# Patient Record
Sex: MALE | Race: WHITE | Employment: FULL TIME | URBAN - METROPOLITAN AREA
[De-identification: names, ages, dates, MRNs, and addresses within clinical notes are randomized per-mention and may not be internally consistent; named-entity substitution may affect disease eponyms.]

---

## 2021-01-25 ENCOUNTER — OFFICE VISIT (OUTPATIENT)
Dept: ORTHOPEDIC SURGERY | Age: 71
End: 2021-01-25
Payer: MEDICARE

## 2021-01-25 VITALS — HEIGHT: 72 IN | TEMPERATURE: 97.5 F | BODY MASS INDEX: 28.44 KG/M2 | WEIGHT: 210 LBS

## 2021-01-25 DIAGNOSIS — M25.512 ACUTE PAIN OF LEFT SHOULDER: Primary | ICD-10-CM

## 2021-01-25 DIAGNOSIS — M19.012 ARTHRITIS OF LEFT SHOULDER REGION: ICD-10-CM

## 2021-01-25 PROCEDURE — G8427 DOCREV CUR MEDS BY ELIG CLIN: HCPCS | Performed by: ORTHOPAEDIC SURGERY

## 2021-01-25 PROCEDURE — 4040F PNEUMOC VAC/ADMIN/RCVD: CPT | Performed by: ORTHOPAEDIC SURGERY

## 2021-01-25 PROCEDURE — 99203 OFFICE O/P NEW LOW 30 MIN: CPT | Performed by: ORTHOPAEDIC SURGERY

## 2021-01-25 PROCEDURE — 20610 DRAIN/INJ JOINT/BURSA W/O US: CPT | Performed by: ORTHOPAEDIC SURGERY

## 2021-01-25 PROCEDURE — G8417 CALC BMI ABV UP PARAM F/U: HCPCS | Performed by: ORTHOPAEDIC SURGERY

## 2021-01-25 PROCEDURE — G8484 FLU IMMUNIZE NO ADMIN: HCPCS | Performed by: ORTHOPAEDIC SURGERY

## 2021-01-25 PROCEDURE — 1123F ACP DISCUSS/DSCN MKR DOCD: CPT | Performed by: ORTHOPAEDIC SURGERY

## 2021-01-25 PROCEDURE — 1036F TOBACCO NON-USER: CPT | Performed by: ORTHOPAEDIC SURGERY

## 2021-01-25 PROCEDURE — 3017F COLORECTAL CA SCREEN DOC REV: CPT | Performed by: ORTHOPAEDIC SURGERY

## 2021-01-25 RX ORDER — LIDOCAINE HYDROCHLORIDE 10 MG/ML
4 INJECTION, SOLUTION INFILTRATION; PERINEURAL ONCE
Status: COMPLETED | OUTPATIENT
Start: 2021-01-25 | End: 2021-01-25

## 2021-01-25 RX ORDER — METHYLPREDNISOLONE ACETATE 40 MG/ML
80 INJECTION, SUSPENSION INTRA-ARTICULAR; INTRALESIONAL; INTRAMUSCULAR; SOFT TISSUE ONCE
Status: COMPLETED | OUTPATIENT
Start: 2021-01-25 | End: 2021-01-25

## 2021-01-25 RX ADMIN — METHYLPREDNISOLONE ACETATE 80 MG: 40 INJECTION, SUSPENSION INTRA-ARTICULAR; INTRALESIONAL; INTRAMUSCULAR; SOFT TISSUE at 14:20

## 2021-01-25 RX ADMIN — LIDOCAINE HYDROCHLORIDE 4 ML: 10 INJECTION, SOLUTION INFILTRATION; PERINEURAL at 14:20

## 2021-01-25 ASSESSMENT — ENCOUNTER SYMPTOMS
RESPIRATORY NEGATIVE: 1
GASTROINTESTINAL NEGATIVE: 1
ALLERGIC/IMMUNOLOGIC NEGATIVE: 1
EYES NEGATIVE: 1

## 2021-01-25 NOTE — PROGRESS NOTES
Subjective:      Patient ID: Enoc Bryson is a 72 y.o. male. HPI  Enoc Bryson is seen today for evaluation of left shoulder pain. His pain started in September of last year when he was moving a couch. Since that time he gets intermittent pain with movement that can be as bad as 10 out of 10. He denies any pain at rest.  He is right-hand dominant. 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 He is otherwise mostly healthy. He has been using Tylenol and heat for his shoulder pain. Review of Systems   Constitutional: Negative. HENT: Positive for ear discharge. Eyes: Negative. Respiratory: Negative. Cardiovascular: Negative. Gastrointestinal: Negative. Endocrine: Negative. Genitourinary: Negative. Musculoskeletal:        Previous bilateral knee surgery    Skin: Negative. Allergic/Immunologic: Negative. Neurological: Negative. Hematological: Negative. Psychiatric/Behavioral: Negative. Objective:   Physical Exam  History: Patient's relevant past family, medical, and social history are reviewed as part of today's visit. ROS of pertinent positives and negatives as above; otherwise negative. General Exam:    Vitals: Temperature 97.5 °F (36.4 °C), temperature source Temporal, height 6' (1.829 m), weight 210 lb (95.3 kg). Constitutional: Patient is adequately groomed with no evidence of malnutrition  Mental Status: The patient is oriented to time, place and person. The patient's mood and affect are appropriate. Gait:  Patient walks with normal gait and station. Lymphatic: The lymphatic examination bilaterally reveals all areas to be without enlargement or induration. Vascular: Examination reveals no swelling or calf tenderness. Peripheral pulses are palpable and 2+. Neurological: The patient has good coordination. There is no weakness or sensory deficit. Skin:    Head/Neck: inspection reveals no rashes, ulcerations or lesions.   Trunk:  inspection reveals no rashes, ulcerations or lesions. Right Lower Extremity: inspection reveals no rashes, ulcerations or lesions. Left Lower Extremity: inspection reveals no rashes, ulcerations or lesions. Examination of the cervical spine reveals no restriction in motion. There are no reproduction of symptoms into either arm with flexion, extension, rotation or palpation. The patient has a negative Spurling sign, and no tenderness. Right shoulder has discomfort with elevation above 150 degrees with good strength and no crepitation with normal motion. Left shoulder has pain with Casey and impingement maneuvers. He has 4/5 strength in external rotation and abduction with only mild discomfort. Is no instability or tenderness. Neurologic and vascular exams are normal.  He has symmetric motion to the right side. Xrays of the left shoulder were obtained today. AP the scapular plane, axillary lateral, and scapular Y. These demonstrate: Moderate glenohumeral arthritis and a type III acromion. Assessment:      His symptoms seem mostly related to his left shoulder arthritis. Plan: We will start with a cortisone injection. Procedure: Under sterile technique, left shoulder was injected anteriorly into the glenohumeral space with 80 mg of Depo-Medrol and 40 mg of lidocaine. He tolerated that well. Follow-up with me in a month.

## 2021-02-22 ENCOUNTER — OFFICE VISIT (OUTPATIENT)
Dept: ORTHOPEDIC SURGERY | Age: 71
End: 2021-02-22
Payer: MEDICARE

## 2021-02-22 VITALS
WEIGHT: 210 LBS | HEART RATE: 58 BPM | HEIGHT: 72 IN | TEMPERATURE: 96.8 F | DIASTOLIC BLOOD PRESSURE: 79 MMHG | BODY MASS INDEX: 28.44 KG/M2 | SYSTOLIC BLOOD PRESSURE: 126 MMHG

## 2021-02-22 DIAGNOSIS — M25.512 ACUTE PAIN OF LEFT SHOULDER: Primary | ICD-10-CM

## 2021-02-22 DIAGNOSIS — M19.012 ARTHRITIS OF LEFT SHOULDER REGION: ICD-10-CM

## 2021-02-22 PROCEDURE — G8417 CALC BMI ABV UP PARAM F/U: HCPCS | Performed by: ORTHOPAEDIC SURGERY

## 2021-02-22 PROCEDURE — G8427 DOCREV CUR MEDS BY ELIG CLIN: HCPCS | Performed by: ORTHOPAEDIC SURGERY

## 2021-02-22 PROCEDURE — 4040F PNEUMOC VAC/ADMIN/RCVD: CPT | Performed by: ORTHOPAEDIC SURGERY

## 2021-02-22 PROCEDURE — G8484 FLU IMMUNIZE NO ADMIN: HCPCS | Performed by: ORTHOPAEDIC SURGERY

## 2021-02-22 PROCEDURE — 3017F COLORECTAL CA SCREEN DOC REV: CPT | Performed by: ORTHOPAEDIC SURGERY

## 2021-02-22 PROCEDURE — 99212 OFFICE O/P EST SF 10 MIN: CPT | Performed by: ORTHOPAEDIC SURGERY

## 2021-02-22 PROCEDURE — 1036F TOBACCO NON-USER: CPT | Performed by: ORTHOPAEDIC SURGERY

## 2021-02-22 PROCEDURE — 1123F ACP DISCUSS/DSCN MKR DOCD: CPT | Performed by: ORTHOPAEDIC SURGERY

## 2021-02-22 NOTE — PROGRESS NOTES
Javier Younger returns today for his left shoulder. He says that with movement or shoveling snow pain is 6 out of 10. He has no pain at rest.  He does not feel like the cortisone injection given a month ago was helpful at all. Patient's medications, allergies, past medical, surgical, social and family histories were reviewed and updated as appropriate. Relevant review of systems reviewed. General Exam:    Vitals: Blood pressure 126/79, pulse 58, temperature 96.8 °F (36 °C), temperature source Temporal, height 6' (1.829 m), weight 210 lb (95.3 kg). Constitutional: Patient is adequately groomed with no evidence of malnutrition  Mental Status: The patient is oriented to time, place and person. The patient's mood and affect are appropriate. 160 degrees and external rotation to 50 with internal rotation to the lumbar spine. Strength throughout is 4/5. He has moderate discomfort in all planes. Neurologic and vascular exams of left upper extremity are normal.    Assessment: Refractory left shoulder pain despite injection and activity modification. Plan: MRI scan left shoulder for potential presurgical planning. Follow-up with me after the scan. This note was created using voice recognition software. It has been proofread, but occasionally errors remain. Please disregard these errors. They will be corrected as they are noted.

## 2021-03-11 ENCOUNTER — OFFICE VISIT (OUTPATIENT)
Dept: ORTHOPEDIC SURGERY | Age: 71
End: 2021-03-11
Payer: MEDICARE

## 2021-03-11 VITALS
BODY MASS INDEX: 28.44 KG/M2 | RESPIRATION RATE: 12 BRPM | HEART RATE: 62 BPM | WEIGHT: 210 LBS | SYSTOLIC BLOOD PRESSURE: 127 MMHG | HEIGHT: 72 IN | DIASTOLIC BLOOD PRESSURE: 76 MMHG | TEMPERATURE: 97.1 F

## 2021-03-11 DIAGNOSIS — M25.512 ACUTE PAIN OF LEFT SHOULDER: Primary | ICD-10-CM

## 2021-03-11 DIAGNOSIS — M19.012 ARTHRITIS OF LEFT SHOULDER REGION: ICD-10-CM

## 2021-03-11 PROCEDURE — 99212 OFFICE O/P EST SF 10 MIN: CPT | Performed by: ORTHOPAEDIC SURGERY

## 2021-03-11 NOTE — PROGRESS NOTES
Javier Younger returns today to follow-up his left shoulder MRI. He continues to complain of pain at least 5 out of 10. He had no relief with injection. Patient's medications, allergies, past medical, surgical, social and family histories were reviewed and updated as appropriate. Relevant review of systems reviewed. General Exam:    Vitals: Blood pressure 127/76, pulse 62, temperature 97.1 °F (36.2 °C), temperature source Infrared, resp. rate 12, height 6' (1.829 m), weight 210 lb (95.3 kg). Constitutional: Patient is adequately groomed with no evidence of malnutrition  Mental Status: The patient is oriented to time, place and person. The patient's mood and affect are appropriate. Left shoulder has some minimal tenderness anteriorly. He can elevate to 160 degrees and excellent rotate to 50 with internal rotation to the mid lumbar spine. Strength is 4+/5. MRI scan is reviewed. It demonstrates:       Moderate to severe glenohumeral osteoarthritis is present with moderate-sized spurs    projecting off the inferomedial humeral head, small spurs projecting off the inferior glenoid,    high-grade chondromalacia throughout the posterior and inferior glenoid with underlying    subchondral cysts and subchondral bone marrow reaction, and joint space narrowing which is most    prominent posteriorly and inferiorly.       A large nondisplaced glenoid labral tear is present which involves the posterior, inferior and    superior glenoid labrum with multiple adjacent small paralabral cysts.  Although one of the    paralabral cysts is within the spinoglenoid notch, there is no evidence of muscular denervation    of the infraspinatus muscle to suggest associated suprascapular nerve entrapment at this time.       A small glenohumeral effusion is present.       Mild tendinosis and peritendinitis of the supraspinatus and infraspinatus tendons is present    without rotator cuff tear.  The teres minor and subscapularis tendons are intact with no    evidence of tendinosis or tendon tear.       Mild tendinosis of the intraarticular segment of the long head of the biceps tendon is present    without biceps tendon tear.       Mild lateral outlet stenosis is present secondary to inferolateral acromial tilt which could    predispose to rotator cuff impingement.       Mild to moderate severity acromioclavicular osteoarthritis is present with mild to moderate    spurring and marginal bone marrow reaction with no substantial associated medial outlet    stenosis.       No evidence of acute muscular strain within the left shoulder, and no substantial fatty    infiltration or atrophy of the musculature of the left shoulder.               CONCLUSION:   1. Moderate to severe glenohumeral osteoarthritis (as detailed above). 2. Large nondisplaced glenoid labral tear involving the posterior, superior and inferior    glenoid labrum with multiple small adjacent paralabral cysts. 3. Small glenohumeral effusion. 4. Mild tendinosis and peritendinitis of the supraspinatus and infraspinatus tendons without    rotator cuff tear. 5. Mild tendinosis of the intraarticular segment of the long head of the biceps tendon without    biceps tendon tear. 6. Mild lateral outlet stenosis secondary to inferolateral acromial tilt which could predispose    to rotator cuff impingement. 7. Mild to moderate severity acromioclavicular osteoarthritis. I reviewed these findings on the report and the images with the patient. Assessment: Significant glenohumeral arthritis without evidence of significant cuff pathology. Plan: I am recommending he see consultation with Dr. Kelle Horan for consideration of arthroplasty. This note was created using voice recognition software. It has been proofread, but occasionally errors remain. Please disregard these errors. They will be corrected as they are noted.

## 2021-04-01 ENCOUNTER — OFFICE VISIT (OUTPATIENT)
Dept: ORTHOPEDIC SURGERY | Age: 71
End: 2021-04-01
Payer: MEDICARE

## 2021-04-01 VITALS — HEIGHT: 72 IN | WEIGHT: 210 LBS | BODY MASS INDEX: 28.44 KG/M2

## 2021-04-01 DIAGNOSIS — M25.512 ACUTE PAIN OF LEFT SHOULDER: ICD-10-CM

## 2021-04-01 DIAGNOSIS — M25.512 LEFT SHOULDER PAIN, UNSPECIFIED CHRONICITY: ICD-10-CM

## 2021-04-01 DIAGNOSIS — M19.012 ARTHRITIS OF LEFT SHOULDER REGION: Primary | ICD-10-CM

## 2021-04-01 PROBLEM — M71.21: Status: ACTIVE | Noted: 2019-12-11

## 2021-04-01 PROBLEM — E78.00 HYPERCHOLESTEREMIA: Status: ACTIVE | Noted: 2017-01-27

## 2021-04-01 PROBLEM — R00.1 PERSISTENT SINUS BRADYCARDIA: Status: ACTIVE | Noted: 2017-01-25

## 2021-04-01 PROBLEM — M25.561 RIGHT KNEE PAIN: Status: ACTIVE | Noted: 2019-10-02

## 2021-04-01 PROBLEM — Z82.49 FAMILY HISTORY OF PACEMAKER: Status: ACTIVE | Noted: 2017-01-25

## 2021-04-01 PROBLEM — M17.11 PRIMARY OSTEOARTHRITIS OF RIGHT KNEE: Status: ACTIVE | Noted: 2019-10-02

## 2021-04-01 PROBLEM — F51.04 CHRONIC INSOMNIA: Status: ACTIVE | Noted: 2017-01-25

## 2021-04-01 PROBLEM — E55.9 VITAMIN D DEFICIENCY: Status: ACTIVE | Noted: 2017-01-27

## 2021-04-01 PROBLEM — M72.2 PLANTAR FASCIITIS, LEFT: Status: ACTIVE | Noted: 2020-03-16

## 2021-04-01 PROBLEM — Z78.9 CONSUMES THREE BEERS DAILY: Status: ACTIVE | Noted: 2017-01-25

## 2021-04-01 PROBLEM — Z71.89 ADVANCE DIRECTIVE DISCUSSED WITH PATIENT: Status: ACTIVE | Noted: 2017-01-25

## 2021-04-01 PROCEDURE — 99214 OFFICE O/P EST MOD 30 MIN: CPT | Performed by: ORTHOPAEDIC SURGERY

## 2021-04-01 NOTE — PROGRESS NOTES
[facility] [address] [facility]  Office Visit  New Patient  Date:  2021    Name:  Luz Medina  Address:  [address] [address]    :  1950      Age:   72 y.o.    SSN:  [id number]      Medical Record Number:  <[id number]>    Chief Complaint:    Left shoulder pain    HPI:   Luz Medina is a 72 y.o. male who presents on referral from Dr. Marcus Garcia for consultation regarding ongoing left shoulder pain. He states he began having left shoulder pain in the Fall of . He denies injury at that time. He was evaluated by Dr. Marcus Garcia, who diagnosed osteoarthritis. He has had an MRI and x-rays and was ultimately given a steroid injection in January of this year without much relief. He is not taking any NSAIDs and is not on any formal physical therapy at this time. He denies any catching or locking or grinding. He has no pain at rest and the pain seems to be aggravated with overhead or outstretched activities. He localizes the pain to the anterior lateral aspect of the shoulder. He denies any new numbness, tingling, fevers, chills, chest pain, shortness of breath, or any other new significant symptoms. Pain Assessment  Location of Pain: Shoulder  Location Modifiers: Left  Severity of Pain: 0  Quality of Pain: Sharp  Duration of Pain: A few minutes  Frequency of Pain: Intermittent  Aggravating Factors: Straightening, Stretching, Other (Comment)(REACHING, BEHIND BACK)  Limiting Behavior: Yes  Relieving Factors: Rest  Result of Injury: No  Work-Related Injury: No  Are there other pain locations you wish to document?: No    Past History:  History reviewed. No pertinent past medical history.     Past Surgical History:   Procedure Laterality Date    HERNIA REPAIR N/A     KNEE ARTHROSCOPY Bilateral     TONSILLECTOMY N/A        Social History     Tobacco Use    Smoking status: Never Smoker    Smokeless tobacco: Never Used   Substance Use Topics    Alcohol use: Yes     Alcohol/week: 12.0 standard drinks     Types: 12 Cans of beer per week    Drug use: Never        Family History:  family history is not on file. Current Outpatient Medications:     OMEPRAZOLE PO, Take by mouth as needed, Disp: , Rfl:       Allergies   Allergen Reactions    Chlorzoxazone Other (See Comments)     fever           Review of Systems: A 14 point review of systems available in the scanned medical record as documented by the patient on 4/1/21. Today's review pertinent items are noted in HPI. Musculoskeletal ROS: positive for - pain in left shoulder        Physical Exam:  Ht 6' (1.829 m)   Wt 210 lb (95.3 kg)   BMI 28.48 kg/m²       General: No acute distress, well nourished  CV: No obvious peripheral edema. Normal peripheral pulses  Neuro: Alert & oriented x 3  Psych: Normal mood and affect    Left shoulder exam    Grossly no periscapular atrophy noted  Flexion-160 degrees bilaterally  Abduction-160s bilaterally  External rotation-50 degrees on the left, 70 degrees on the right  Internal rotation-L1 on the left, T12 on the right  5/5 strength with internal rotation, external rotation, Argelia, Blaine toast  Negative speeds, negative Yergason's  Negative Casey, negative Neer's    Radiographic:  No new imaging today. MRI was reviewed demonstrating no rotator cuff tear but some rotator cuff tendinitis. He does have mild to moderate glenohumeral joint arthritis as well. X-ray does show a mild to moderate arthritis of the glenohumeral joint    Assessment:  Sam López is a 72 y.o. male with:  1. Left shoulder glenohumeral arthritis    Impression:  Encounter Diagnoses   Name Primary?     Arthritis of left shoulder region Yes    Acute pain of left shoulder     Left shoulder pain, unspecified chronicity        Office Procedures:  Orders Placed This Encounter   Procedures   Lianet Thorpe MD, Orthopedic Surgery, [facility]     Referral Priority:   Routine

## 2021-04-14 ENCOUNTER — OFFICE VISIT (OUTPATIENT)
Dept: ORTHOPEDIC SURGERY | Age: 71
End: 2021-04-14
Payer: MEDICARE

## 2021-04-14 VITALS
HEART RATE: 59 BPM | HEIGHT: 72 IN | BODY MASS INDEX: 28.44 KG/M2 | SYSTOLIC BLOOD PRESSURE: 134 MMHG | WEIGHT: 210 LBS | DIASTOLIC BLOOD PRESSURE: 81 MMHG

## 2021-04-14 DIAGNOSIS — M19.012 ARTHRITIS OF LEFT SHOULDER REGION: Primary | ICD-10-CM

## 2021-04-14 PROCEDURE — 99212 OFFICE O/P EST SF 10 MIN: CPT | Performed by: ORTHOPAEDIC SURGERY

## 2021-04-14 PROCEDURE — 20611 DRAIN/INJ JOINT/BURSA W/US: CPT | Performed by: ORTHOPAEDIC SURGERY

## 2021-04-14 RX ORDER — METHYLPREDNISOLONE ACETATE 40 MG/ML
80 INJECTION, SUSPENSION INTRA-ARTICULAR; INTRALESIONAL; INTRAMUSCULAR; SOFT TISSUE ONCE
Status: COMPLETED | OUTPATIENT
Start: 2021-04-14 | End: 2021-04-14

## 2021-04-14 RX ORDER — LIDOCAINE HYDROCHLORIDE 10 MG/ML
5 INJECTION, SOLUTION INFILTRATION; PERINEURAL ONCE
Status: COMPLETED | OUTPATIENT
Start: 2021-04-14 | End: 2021-04-14

## 2021-04-14 RX ADMIN — LIDOCAINE HYDROCHLORIDE 5 ML: 10 INJECTION, SOLUTION INFILTRATION; PERINEURAL at 10:22

## 2021-04-14 RX ADMIN — METHYLPREDNISOLONE ACETATE 80 MG: 40 INJECTION, SUSPENSION INTRA-ARTICULAR; INTRALESIONAL; INTRAMUSCULAR; SOFT TISSUE at 10:23

## 2021-04-14 NOTE — PROGRESS NOTES
Follow Up Shoulder Evaluation   4/14/2021    Kodi Mendez      1950        Lynne Nielsen is seen in follow up for Follow-up (Left shoulder ultrasound Glenohumeral injection. Referred by Dr Kyle Ragsdale)     Lynne Nielsen is a 45-year-old patient sent by Dr. Tiffany Dallas for an ultrasound directed intra-articular steroid injection of his left shoulder. He has had 1 previous injection in January performed by Dr. Simone Eddy however did not obtain relief from that injection. X-rays and a more recent MRI scan performed on March 9, 2021 did verify the presence of osteoarthritis of the left glenohumeral joint. In addition there was some MRI scan evidence of tendinosis of the rotator cuff and biceps tendon. Because of his persistence of pain he was sent to our office for an ultrasound directed injection of his glenohumeral joint. I have today reviewed with Kodi Mendez the clinically relevant past medical history, medications, allergies, family history, and Review of Systems from the patients most recent history form, and I have documented any details relevant to today's presenting complaints in my history above. The patient's self recorded documents concerning the above have been scanned  into the chart under the \"Media\" tab. /81   Pulse 59   Ht 6' (1.829 m)   Wt 210 lb (95.3 kg)   BMI 28.48 kg/m²     PHYSICAL EXAMINATION          X-Ray Findings taken in Office: X-rays taken on January 25, 2021 show moderate changes of glenohumeral arthritis with osteophytic changes about the humeral head. Humeral acromial space is maintained. MRI Findings: MRI scan performed on March 9, 2021 did verify the presence of osteoarthritis of the left glenohumeral joint. In addition there was some MRI scan evidence of tendinosis of the rotator cuff and biceps tendon. Impression:   Left shoulder glenohumeral neuritis with superimposed rotator cuff and biceps tendinosis. Plan/Treatment:   1.   Injection with cortisone was recommended into  right   shoulder glenohumeral joint using ultrasound visualization. He understands the risks and benefits of the injection and describes no potential allergies. Time out was performed to verify correct person, correct procedure, and correct site. 2.  The structures of the shoulder were visualized with a disco volante Ultrasound unit using an 5/2 MHz CurviLinear Probe. The injection site was cleaned with ChloroPrep, infiltrated with 3 mL of 1% Xylocaine, then  2 ml  of 40 mg Depo Medrol combined with 2 ml of 1% Lidocaine were injected into the glenohumeral joint. 3.  Since he has had no physical therapy I have written a prescription for PT for rehabilitation of his left shoulder he has there is an element of tendinosis associated with his arthritis. Karel Hogan MD  4/14/2021    This dictation was done with Dragon dictation and may contain mechanical errors related to translation.

## 2021-04-14 NOTE — PATIENT INSTRUCTIONS
Impression:   Left shoulder glenohumeral neuritis with superimposed rotator cuff and biceps tendinosis. Plan/Treatment:   1. Injection with cortisone was recommended into  right   shoulder glenohumeral joint using ultrasound visualization. He understands the risks and benefits of the injection and describes no potential allergies. Time out was performed to verify correct person, correct procedure, and correct site. 2.  The structures of the shoulder were visualized with a Widget[employer] Ultrasound unit using an 5/2 MHz CurviLinear Probe. The injection site was cleaned with ChloroPrep, infiltrated with 3 mL of 1% Xylocaine, then  2 ml  of 40 mg Depo Medrol combined with 2 ml of 1% Lidocaine were injected into the glenohumeral joint. 3.  Since he has had no physical therapy I have written a prescription for PT for rehabilitation of his left shoulder he has there is an element of tendinosis associated with his arthritis.       Curt Garcia MD  4/14/2021

## 2021-04-26 ENCOUNTER — HOSPITAL ENCOUNTER (OUTPATIENT)
Dept: PHYSICAL THERAPY | Age: 71
Setting detail: THERAPIES SERIES
Discharge: HOME | End: 2021-04-26
Payer: MEDICARE

## 2021-04-26 PROCEDURE — 97112 NEUROMUSCULAR REEDUCATION: CPT | Performed by: PHYSICAL THERAPIST

## 2021-04-26 PROCEDURE — 97110 THERAPEUTIC EXERCISES: CPT | Performed by: PHYSICAL THERAPIST

## 2021-04-26 PROCEDURE — 97161 PT EVAL LOW COMPLEX 20 MIN: CPT | Performed by: PHYSICAL THERAPIST

## 2021-04-26 NOTE — FLOWSHEET NOTE
[address] [facility],[address], [address] [facility], [address]  Phone: [phone]   Fax: [phone]    Physical Therapy Treatment Note/ Progress Report:           Date:  2021    Patient Name:  Hermelindo Dejesus    :  1950  MRN: [id number]  Restrictions/Precautions:    Medical/Treatment Diagnosis Information:  Diagnosis: Arthritis of left shoulder region - M19.012  Treatment Diagnosis: decreased ROM, strength, and high-level IADLs  Insurance/Certification information:  PT Insurance Information: [place] [address]  Physician Information:  Referring Practitioner: Dr. Mannie Martin  Has the plan of care been signed (Y/N):        []  Yes  [x]  No     Date of Patient follow up with Physician: 21 - Dr. Yelena Eid      Is this a Progress Report:     []  Yes  [x]  No        Progress report/ Recertification will be due (10 Rx or 30 days whichever is less): 24 May 2021      Visit # Insurance Allowable Auth Required   1 BMN []  Yes []  No        Functional Scale:   UEFI   - 24% limitation      Latex Allergy:  [x]NO      []YES  Preferred Language for Healthcare:   [x]English       []other:      Pain level:  3-4 /10       SUBJECTIVE:  See bri      OBJECTIVE: See bri     Observation:    Test measurements:      RESTRICTIONS/PRECAUTIONS: none    Exercises/Interventions:   Exercise/Equipment Resistance/Repetitions Other comments   Stretching/PROM     Upper trap stretch 30 sec x 3 Added     Wall slides - flex 10 sec x 10  Added    IR cane 10 sec x 10  Added    Sleeper wall stretch 10 sec x 10  Added    Pendulum          Isometrics     Retraction 10 sec x 10  Added         Weight shift     Flexion     Abduction     External Rotation     Internal Rotation     Biceps     Triceps          PRE's     Flexion     Abduction     External Rotation     Internal Rotation     Shrugs     EXT     Reverse Flys     Serratus     Horizontal Abd with ER Biceps     Triceps     Retraction          Cable Column/Theraband     External Rotation     Internal Rotation     Shrugs     Lats     Ext     Flex     Scapular Retraction     BIC     TRIC     PNF          Dynamic Stability          Plyoback          Manual interventions     Rib mobilizations NV    Thoracic mobilizations NV                 Therapeutic Exercise and NMR EXR  [x] (05853) Provided verbal/tactile cueing for activities related to strengthening, flexibility, endurance, ROM  for improvements in scapular, scapulothoracic and UE control with self care, reaching, carrying, lifting, house/yardwork, driving/computer work. [x] (61224) Provided verbal/tactile cueing for activities related to improving balance, coordination, kinesthetic sense, posture, motor skill, proprioception  to assist with  scapular, scapulothoracic and UE control with self care, reaching, carrying, lifting, house/yardwork, driving/computer work. Therapeutic Activities:    [x] (04155 or 11810) Provided verbal/tactile cueing for activities related to improving balance, coordination, kinesthetic sense, posture, motor skill, proprioception and motor activation to allow for proper function of scapular, scapulothoracic and UE control with self care, carrying, lifting, driving/computer work.      Home Exercise Program:    [x] (14508) Reviewed/Progressed HEP activities related to strengthening, flexibility, endurance, ROM of scapular, scapulothoracic and UE control with self care, reaching, carrying, lifting, house/yardwork, driving/computer work  [] (82864) Reviewed/Progressed HEP activities related to improving balance, coordination, kinesthetic sense, posture, motor skill, proprioception of scapular, scapulothoracic and UE control with self care, reaching, carrying, lifting, house/yardwork, driving/computer work      Access Code: [email or web address]    Manual Treatments:  PROM / STM / Oscillations-Mobs:  G-I, II, III, IV (PA's, Inf., Post.)  [] (18594) Provided manual therapy to mobilize soft tissue/joints of cervical/CT, scapular GHJ and UE for the purpose of modulating pain, promoting relaxation,  increasing ROM, reducing/eliminating soft tissue swelling/inflammation/restriction, improving soft tissue extensibility and allowing for proper ROM for normal function with self care, reaching, carrying, lifting, house/yardwork, driving/computer work    Modalities:     [] GAME READY (VASO)- for significant edema, swelling, pain control. Charges:  Timed Code Treatment Minutes: 25'    Total Treatment Minutes: 39'       [x] EVAL (LOW) 54455 (typically 20 minutes face-to-face)  [] EVAL (MOD) 12027 (typically 30 minutes face-to-face)  [] EVAL (HIGH) 29905 (typically 45 minutes face-to-face)  [] RE-EVAL     [x] LY(19213) x 1    [] IONTO  [x] NMR (19855) x 1    [] VASO  [] Manual (85856) x     [] Other:  [] TA x      [] Mech Traction (02107)  [] ES(attended) (66603)      [] ES (un) (91304):         ASSESSMENT:  See eval  4/26      GOALS:  Patient stated goal: flex and strength   [] Progressing: [] Met: [] Not Met: [] Adjusted    Therapist goals for Patient:   Short Term Goals: To be achieved in: 2 weeks  1. Independent in HEP and progression per patient tolerance, in order to prevent re-injury. [] Progressing: [] Met: [] Not Met: [] Adjusted   2. Patient will have a decrease in pain to facilitate improvement in movement, function, and ADLs as indicated by Functional Deficits. [] Progressing: [] Met: [] Not Met: [] Adjusted    Long Term Goals: To be achieved in: 4-6 weeks  1. Disability index score of 12% or less for the UEFI to assist with reaching prior level of function. [] Progressing: [] Met: [] Not Met: [] Adjusted  2. Patient will demonstrate increased left shoulder flex AROM to greater than or equal to 165 deg and IR AROM to greater than or equal to 50 deg to allow for proper joint functioning as indicated by patients Functional Deficits.     [] Progressing: [] Met: [] Not Met: [] Adjusted  3. Patient will demonstrate an increase in left shoulder (flex, ABD, IR, and ER) strength to 4+/5 to allow for proper functional mobility as indicated by patients Functional Deficits. [] Progressing: [] Met: [] Not Met: [] Adjusted  4. Patient will return to ADLs without increased symptoms or restriction. [] Progressing: [] Met: [] Not Met: [] Adjusted  5. Patient will report working pain free. [] Progressing: [] Met: [] Not Met: [] Adjusted      Overall Progression Towards Functional goals/ Treatment Progress Update:  [] Patient is progressing as expected towards functional goals listed. [] Progression is slowed due to complexities/Impairments listed. [] Progression has been slowed due to co-morbidities. [x] Plan just implemented, too soon to assess goals progression <30days   [] Goals require adjustment due to lack of progress  [] Patient is not progressing as expected and requires additional follow up with physician  [] Other    Prognosis for POC: [x] Good [] Fair  [] Poor      Patient requires continued skilled intervention: [x] Yes  [] No      Treatment/Activity Tolerance:  [x] Patient tolerated treatment well [] Patient limited by fatique  [] Patient limited by pain  [] Patient limited by other medical complications  [] Other:     Patient education: Patient education on PT and plan of care including diagnosis, prognosis, treatment goals and options. Patient agrees with discussed POC and treatment and is aware of rehab process. 4/26    PLAN: See eval 4/26  [] Continue per plan of care [] Alter current plan (see comments above)  [x] Plan of care initiated [] Hold pending MD visit [] Discharge      Electronically signed by:  Juan J Padilla, PT, DPT     Note: If patient does not return for scheduled/ recommended follow up visits, this note will serve as a discharge from care along with most recent update on progress.

## 2021-04-26 NOTE — PLAN OF CARE
[address] [facility],[address], [address] [place], [address]  Phone: [phone]   Fax: [phone]      Physical Therapy Certification    Dear Referring Practitioner: Dr. Maarh Smith,    We had the pleasure of evaluating the following patient for physical therapy services at [address] [place] [address]. A summary of our findings can be found in the initial assessment below. This includes our plan of care. If you have any questions or concerns regarding these findings, please do not hesitate to contact me at the office phone number checked above. Thank you for the referral.       Physician Signature:_______________________________Date:__________________  By signing above (or electronic signature), therapists plan is approved by physician      Patient: Angela Arias   : 1950   MRN: [id number]  Referring Physician: Referring Practitioner: Dr. Marah Smith      Evaluation Date: 2021      Medical Diagnosis Information:  Diagnosis: Arthritis of left shoulder region - M19.012   Treatment Diagnosis: decreased ROM, strength, and high-level IADLs                                         Insurance information: PT Insurance Information: Raj GUERRA    C-SSRS Triggered by Intake questionnaire (Past 2 wk assessment):   [x] No, Questionnaire did not trigger screening.   [] Yes, Patient intake triggered further evaluation      [] C-SSRS Screening completed  [] PCP notified via Plan of Care  [] Emergency services notified     Precautions/ Contra-indications: none  Latex Allergy:  [x]NO      []YES  Preferred Language for Healthcare:   [x]English       []other:    SUBJECTIVE: Patient stated complaint: was helping his daughter move in Sept. He first went to the ortho MD in  and received a cortisone shot. He states it did not help. He was referred to an ortho to discuss consideration of arthroplasty.  After seeing the ortho MD for the arhroplasty, they did not recommend it at this time, so was referred for an ultrsound- guided steroid shot. He states the second shot helped a little bit, but after he was active it wore off. CLOF: He states at rest, he has min discomfort. He states reaching out with minimal resistance will increase pain. He states he is unable to sleep on the L shoulder. Driving does not change symptoms. He states he has pain when trying to tuck his shirt in, in the back. Overhead activity also increases pain. 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 Relevant Medical History: hearing difficulty, R shoulder pain, OA    Functional Disability Index:  UEFI  4/26 - 24% limitation     Pain Scale: 3-4/10 at rest, 10/10 at worst   Easing factors: rest  Provocative factors: listed above     Type: [x]Constant   []Intermittent  []Radiating []Localized []other:     Numbness/Tingling: None    Occupation/School: [employer] - part time    Hobbies: yard work, camping, bicycling - few times a week, motorcycle     Living Status/Prior Level of Function: Independent with ADLs, IADLs, and work    OBJECTIVE:     ROM AROM  Comment    L R    Flexion 152 + pain  165    Abduction 148 + pain  140 + pain     ER 73 70    IR 40 39    Other(cervical)          Strength L R Comment   Flexion 4/5 4/5    Abduction 4/5 4-/5    ER 4-/5 + pain  4-/5    IR 4-/5 4-/5          Lower Trap      Mid Trap      Rhomboids 4/5 + pain  4/5    Biceps 4/5 4+/5    Triceps 4/5 4+/5      Special Tests Results/Comment   Casey-Glenn Pos   Neers Pos   Speeds Pos    OBriens Neg   Other      Reflexes/Sensation:    [x]Dermatomes/Myotomes intact    [x]Reflexes equal and normal bilaterally   []Other:    Joint mobility:    []Normal    [x]Hypo - cervical    []Hyper    Palpation: no TTP     Functional Mobility/Transfers: Independent     Posture: rounded shoulders    Gait: (include devices/WB status):  WNL                         [x] Patient history, allergies, meds reviewed. Medical chart reviewed. See intake form. Review Of Systems (ROS):  [x]Performed Review of systems (Integumentary, CardioPulmonary, Neurological) by intake and observation. Intake form has been scanned into medical record. Patient has been instructed to contact their primary care physician regarding ROS issues if not already being addressed at this time. Co-morbidities/Complexities (which will affect course of rehabilitation):  []None           Arthritic conditions   []Rheumatoid arthritis (M05.9)  [x]Osteoarthritis (M19.91)   Cardiovascular conditions   []Hypertension (I10)  []Hyperlipidemia (E78.5)  []Angina pectoris (I20)  []Atherosclerosis (I70)   Musculoskeletal conditions   []Disc pathology   []Congenital spine pathologies   []Prior surgical intervention  []Osteoporosis (M81.8)  []Osteopenia (M85.8)   Endocrine conditions   []Hypothyroid (E03.9)  []Hyperthyroid Gastrointestinal conditions   []Constipation (D45.45)   Metabolic conditions   []Morbid obesity (E66.01)  []Diabetes type 1(E10.65) or 2 (E11.65)   []Neuropathy (G60.9)     Pulmonary conditions   []Asthma (J45)  []Coughing   []COPD (J44.9)   Psychological Disorders  []Anxiety (F41.9)  []Depression (F32.9)   []Other:   []Other:          Barriers to/and or personal factors that will affect rehab potential:              []Age  []Sex              []Motivation/Lack of Motivation                        []Co-Morbidities              []Cognitive Function, education/learning barriers              [x]Environmental, home barriers              []profession/work barriers  []past PT/medical experience  []other:  Justification: Pt continues to work and stays busy with yard work, which increases his pain, and may affect rehab potential.     Falls Risk Assessment (30 days):   [x] Falls Risk assessed and no intervention required.   [] Falls Risk assessed and Patient requires intervention due to being higher risk   TUG score (>12s at risk):     [] Falls education provided, including       ASSESSMENT:   Functional Impairments   [x]Noted spinal or UE joint hypomobility   []Noted spinal or UE joint hypermobility   [x]Decreased UE functional ROM   [x]Decreased UE functional strength   []Abnormal reflexes/sensation/myotomal/dermatomal deficits   [x]Decreased RC/scapular/core strength and neuromuscular control   []other:      Functional Activity Limitations (from functional questionnaire and intake)   [x]Reduced ability to tolerate prolonged functional positions   [x]Reduced ability or difficulty with changes of positions or transfers between positions   [x]Reduced ability to maintain good posture and demonstrate good body mechanics with sitting, bending, and lifting   [] Reduced ability or tolerance with driving and/or computer work   [x]Reduced ability to sleep   [x]Reduced ability to perform lifting, reaching, carrying tasks   []Reduced ability to tolerate impact through UE   [x]Reduced ability to reach behind back   []Reduced ability to  or hold objects   []Reduced ability to throw or toss an object   []other:      Participation Restrictions   []Reduced participation in self care activities   [x]Reduced participation in home management activities   []Reduced participation in work activities   [x]Reduced participation in social activities. []Reduced participation in sport / recreational activities. Classification:   []Signs/symptoms consistent with post-surgical status including decreased ROM, strength and function.   []Signs/symptoms consistent with joint sprain/strain   []Signs/symptoms consistent with shoulder impingement   [x]Signs/symptoms consistent with shoulder/elbow/wrist tendinopathy   []Signs/symptoms consistent with Rotator cuff tear   []Signs/symptoms consistent with labral tear   []Signs/symptoms consistent with postural dysfunction    []Signs/symptoms consistent with Glenohumeral IR Deficit - <45 degrees   []Signs/symptoms consistent with spine to include: Dry Needling/IASTM, STM, PROM, Gr I-IV mobilizations, manipulation. [x] Modalities as needed that may include: thermal agents, E-stim, Biofeedback, US, iontophoresis as indicated  [x] Patient education on joint protection, postural re-education, activity modification, progression of HEP. HEP instruction: (see scanned forms)    GOALS:  Patient stated goal: flex and strength   [] Progressing: [] Met: [] Not Met: [] Adjusted    Therapist goals for Patient:   Short Term Goals: To be achieved in: 2 weeks  1. Independent in HEP and progression per patient tolerance, in order to prevent re-injury. [] Progressing: [] Met: [] Not Met: [] Adjusted   2. Patient will have a decrease in pain to facilitate improvement in movement, function, and ADLs as indicated by Functional Deficits. [] Progressing: [] Met: [] Not Met: [] Adjusted    Long Term Goals: To be achieved in: 4-6 weeks  1. Disability index score of 12% or less for the UEFI to assist with reaching prior level of function. [] Progressing: [] Met: [] Not Met: [] Adjusted  2. Patient will demonstrate increased left shoulder flex AROM to greater than or equal to 165 deg and IR AROM to greater than or equal to 50 deg to allow for proper joint functioning as indicated by patients Functional Deficits. [] Progressing: [] Met: [] Not Met: [] Adjusted  3. Patient will demonstrate an increase in left shoulder (flex, ABD, IR, and ER) strength to 4+/5 to allow for proper functional mobility as indicated by patients Functional Deficits. [] Progressing: [] Met: [] Not Met: [] Adjusted  4. Patient will return to ADLs without increased symptoms or restriction. [] Progressing: [] Met: [] Not Met: [] Adjusted  5. Patient will report working pain free.    [] Progressing: [] Met: [] Not Met: [] Adjusted     Electronically signed by:  Genaro Lorenzo, PT, DPT

## 2021-05-04 ENCOUNTER — HOSPITAL ENCOUNTER (OUTPATIENT)
Dept: PHYSICAL THERAPY | Age: 71
Setting detail: THERAPIES SERIES
Discharge: HOME | End: 2021-05-04
Payer: MEDICARE

## 2021-05-04 PROCEDURE — 97112 NEUROMUSCULAR REEDUCATION: CPT | Performed by: PHYSICAL THERAPIST

## 2021-05-04 PROCEDURE — 97110 THERAPEUTIC EXERCISES: CPT | Performed by: PHYSICAL THERAPIST

## 2021-05-04 PROCEDURE — 97140 MANUAL THERAPY 1/> REGIONS: CPT | Performed by: PHYSICAL THERAPIST

## 2021-05-04 NOTE — FLOWSHEET NOTE
External Rotation 10 sec x 10  Added 5/4   Internal Rotation 10 sec x 10 Added 5/4   Biceps     Triceps          PRE's     Flexion     Abduction     External Rotation     Internal Rotation     Shrugs     EXT     Reverse Flys     Serratus     Horizontal Abd with ER     Biceps     Triceps     Retraction          Cable Column/Theraband     External Rotation     Internal Rotation     Shrugs     Lats     Ext     Flex     Scapular Retraction     BIC     TRIC     PNF          Dynamic Stability          Plyoback          Manual interventions     Rib 1 & 2 mobilizations 4' grade III-IV Added 5/4   Thoracic mobilizations 5' grade III-IV Added 5/4   Cervical downglides and occipital release 5' grade III-IV Added 5/4           Therapeutic Exercise and NMR EXR  [x] (97463) Provided verbal/tactile cueing for activities related to strengthening, flexibility, endurance, ROM  for improvements in scapular, scapulothoracic and UE control with self care, reaching, carrying, lifting, house/yardwork, driving/computer work. [x] (72607) Provided verbal/tactile cueing for activities related to improving balance, coordination, kinesthetic sense, posture, motor skill, proprioception  to assist with  scapular, scapulothoracic and UE control with self care, reaching, carrying, lifting, house/yardwork, driving/computer work. Therapeutic Activities:    [x] (58058 or 25934) Provided verbal/tactile cueing for activities related to improving balance, coordination, kinesthetic sense, posture, motor skill, proprioception and motor activation to allow for proper function of scapular, scapulothoracic and UE control with self care, carrying, lifting, driving/computer work.      Home Exercise Program:    [x] (19187) Reviewed/Progressed HEP activities related to strengthening, flexibility, endurance, ROM of scapular, scapulothoracic and UE control with self care, reaching, carrying, lifting, house/yardwork, driving/computer work  [] (67965) Reviewed/Progressed HEP activities related to improving balance, coordination, kinesthetic sense, posture, motor skill, proprioception of scapular, scapulothoracic and UE control with self care, reaching, carrying, lifting, house/yardwork, driving/computer work      Access Code: [email or web address]  Last updated 5/4    Manual Treatments:  PROM / STM / Oscillations-Mobs:  G-I, II, III, IV (PA's, Inf., Post.)  [] (19442) Provided manual therapy to mobilize soft tissue/joints of cervical/CT, scapular GHJ and UE for the purpose of modulating pain, promoting relaxation,  increasing ROM, reducing/eliminating soft tissue swelling/inflammation/restriction, improving soft tissue extensibility and allowing for proper ROM for normal function with self care, reaching, carrying, lifting, house/yardwork, driving/computer work    Modalities:     [] GAME READY (VASO)- for significant edema, swelling, pain control. Charges:  Timed Code Treatment Minutes: 48'    Total Treatment Minutes: 54'       [] EVAL (LOW) 82060 (typically 20 minutes face-to-face)  [] EVAL (MOD) 70025 (typically 30 minutes face-to-face)  [] EVAL (HIGH) 93111 (typically 45 minutes face-to-face)  [] RE-EVAL     [x] SX(19601) x 1    [] IONTO  [x] NMR (19632) x 1    [] VASO  [x] Manual (32564) x 1    [] Other:  [] TA x      [] Mech Traction (92574)  [] ES(attended) (29521)      [] ES (un) (28161):         ASSESSMENT:        GOALS:  Patient stated goal: flex and strength   [] Progressing: [] Met: [] Not Met: [] Adjusted    Therapist goals for Patient:   Short Term Goals: To be achieved in: 2 weeks  1. Independent in HEP and progression per patient tolerance, in order to prevent re-injury. [] Progressing: [] Met: [] Not Met: [] Adjusted   2. Patient will have a decrease in pain to facilitate improvement in movement, function, and ADLs as indicated by Functional Deficits. [] Progressing: [] Met: [] Not Met: [] Adjusted    Long Term Goals:  To be achieved in: 4-6 weeks  1. Disability index score of 12% or less for the UEFI to assist with reaching prior level of function. [] Progressing: [] Met: [] Not Met: [] Adjusted  2. Patient will demonstrate increased left shoulder flex AROM to greater than or equal to 165 deg and IR AROM to greater than or equal to 50 deg to allow for proper joint functioning as indicated by patients Functional Deficits. [] Progressing: [] Met: [] Not Met: [] Adjusted  3. Patient will demonstrate an increase in left shoulder (flex, ABD, IR, and ER) strength to 4+/5 to allow for proper functional mobility as indicated by patients Functional Deficits. [] Progressing: [] Met: [] Not Met: [] Adjusted  4. Patient will return to ADLs without increased symptoms or restriction. [] Progressing: [] Met: [] Not Met: [] Adjusted  5. Patient will report working pain free. [] Progressing: [] Met: [] Not Met: [] Adjusted      Overall Progression Towards Functional goals/ Treatment Progress Update:  [] Patient is progressing as expected towards functional goals listed. [] Progression is slowed due to complexities/Impairments listed. [] Progression has been slowed due to co-morbidities. [x] Plan just implemented, too soon to assess goals progression <30days   [] Goals require adjustment due to lack of progress  [] Patient is not progressing as expected and requires additional follow up with physician  [] Other    Prognosis for POC: [x] Good [] Fair  [] Poor      Patient requires continued skilled intervention: [x] Yes  [] No      Treatment/Activity Tolerance:  [x] Patient tolerated treatment well [] Patient limited by fatique  [] Patient limited by pain  [] Patient limited by other medical complications  [] Other: Pt guillaume session well. PT observed hypomobility with the thoracic and rib mobility. He guillaume thoracic ext, improving with increased reps. He guillaume the addition of shoulder isometrics, denying any pain.  5/4    Patient education: Patient education on PT and plan of care including diagnosis, prognosis, treatment goals and options. Patient agrees with discussed POC and treatment and is aware of rehab process. 4/26    PLAN: See eval 4/26  [] Continue per plan of care [] Alter current plan (see comments above)  [x] Plan of care initiated [] Hold pending MD visit [] Discharge      Electronically signed by:  Clent Mcburney, PT, DPT     Note: If patient does not return for scheduled/ recommended follow up visits, this note will serve as a discharge from care along with most recent update on progress.

## 2021-05-06 ENCOUNTER — OFFICE VISIT (OUTPATIENT)
Dept: ORTHOPEDIC SURGERY | Age: 71
End: 2021-05-06
Payer: MEDICARE

## 2021-05-06 VITALS — WEIGHT: 210 LBS | HEIGHT: 72 IN | BODY MASS INDEX: 28.44 KG/M2

## 2021-05-06 DIAGNOSIS — M19.012 ARTHRITIS OF LEFT SHOULDER REGION: Primary | ICD-10-CM

## 2021-05-06 PROCEDURE — 99213 OFFICE O/P EST LOW 20 MIN: CPT | Performed by: ORTHOPAEDIC SURGERY

## 2021-05-06 NOTE — PROGRESS NOTES
Chief Complaint    Follow-up (Left Shoulder)      History of Present Illness:  Laura Cristina is a pleasant, 72 y.o., male, here today for follow up of his left shoulder. He has known moderate glenohumeral osteoarthritis of the left shoulder. He was able to get into some physical therapy but states he's only had two visits as they are behind in scheduling. Additionally he underwent an ultrasound guided injection by Dr. Colin Pino. Overall he has not noticed a significant improvement in symptoms. He continues to modify activities to avoid pain. Most of his pain is in the early mornings. He reports no new injuries or setbacks. Pain Assessment  Location of Pain: Shoulder  Location Modifiers: Left  Severity of Pain: 0  Quality of Pain: Sharp  Duration of Pain: Persistent  Frequency of Pain: Intermittent  Aggravating Factors: (quick movements)  Limiting Behavior: Yes  Relieving Factors: Rest  Work-Related Injury: No  Are there other pain locations you wish to document?: No      Medical History:  Patient's medications, allergies, past medical, surgical, social and family histories were reviewed and updated as appropriate. Review of Systems  A 14 point review of systems was completed by the patient on 4/1/21 and is available in the media section of the scanned medical record and was reviewed on 5/6/2021. The review is negative with the exception of those things mentioned in the HPI and Past Medical History    Vital Signs:  Vitals:       General/Appearance: Alert and oriented and in no apparent distress. Skin:  There are no skin lesions, cellulitis, or extreme edema. The patient has warm and well-perfused Bilateral upper extremities with brisk capillary refill. Left Shoulder Exam:  Inspection: No gross deformities, no signs of infection. Palpation: Non-tender to palpate [facility] joint     Active Range of Motion:   Forward Elevation 160, Internal Rotation L2 vs T12    Passive Range of Motion: Deferred    Strength:  External Rotation 5/5, Internal Rotation 5/5    Special Tests: Posterior pain with cross arm adduction, No Garry muscle deformity. Neurovascular: Sensation to light touch is intact, no motor deficits, palpable radial pulses 2+      Radiology:     No new XR obtained at this time. Assessment :  Kindra Gore is a 72 y.o. male with: Left shoulder glenohumeral arthritis as well as AC joint arthritis as seen on imaging which seems to be asymptomatic at this time. Impression:  Encounter Diagnosis   Name Primary?  Arthritis of left shoulder region Yes       Office Procedures:  Orders Placed This Encounter   Procedures    [facility]     Referral Priority:   Routine     Referral Type:   Eval and Treat     Referral Reason:   Specialty Services Required     Requested Specialty:   Physical Therapy     Number of Visits Requested:   1       Treatment Plan:  We explained to Kindra Gore that he may be heading towards a total shoulder, however his motion is too good at this time. We would like to give therapy a better chance as he has only had a few visits. We recommend He continue in physical therapy at the [address] [place] [address] office. A new physical therapy letter was documented in [facility] today. He should make time for his exercises in between visits. We will see Adalgisa Duncan back in 4 weeks and/or as needed. All questions were answered to patient's satisfaction and He was encouraged to call with any further questions or concerns. Fredruben Bassjosé is in agreement with this plan. 5/6/2021  1:41 PM    Mulugeta Angelo ATC  [employer] [address]    During this examination, I, Apple Quiroz, functioned as a scribe for Dr. Jessica Barnett. The history taking and physical examination were performed by Dr. Yolanda Escobar. All counseling during the appointment was performed between the patient and Dr. Yolanda Escobar.  5/6/21 ____________  I, Dr. Dinh Malcolm, personally performed the services described in this documentation as described by Savage Ibarra ATC in my presence, and it is both accurate and complete. Cong Coronel MD, PhD  5/6/2021

## 2021-05-11 ENCOUNTER — HOSPITAL ENCOUNTER (OUTPATIENT)
Dept: PHYSICAL THERAPY | Age: 71
Setting detail: THERAPIES SERIES
Discharge: HOME | End: 2021-05-11
Payer: MEDICARE

## 2021-05-11 PROCEDURE — 97140 MANUAL THERAPY 1/> REGIONS: CPT | Performed by: PHYSICAL THERAPIST

## 2021-05-11 PROCEDURE — 97110 THERAPEUTIC EXERCISES: CPT | Performed by: PHYSICAL THERAPIST

## 2021-05-11 PROCEDURE — 97112 NEUROMUSCULAR REEDUCATION: CPT | Performed by: PHYSICAL THERAPIST

## 2021-05-11 NOTE — FLOWSHEET NOTE
[address] [facility],[address], [address] [place], [address]  Phone: [phone]   Fax: [phone]    Physical Therapy Treatment Note/ Progress Report:           Date:  2021    Patient Name:  Argentina Julian    :  1950  MRN: [id number]  Restrictions/Precautions:    Medical/Treatment Diagnosis Information:  Diagnosis: Arthritis of left shoulder region - M19.012  Treatment Diagnosis: decreased ROM, strength, and high-level IADLs  Insurance/Certification information:  PT Insurance Information: Raj [address]  Physician Information:  Referring Practitioner: Dr. Naida Johnston  Has the plan of care been signed (Y/N):        []  Yes  [x]  No     Date of Patient follow up with Physician: 21 - Dr. Lau Home      Is this a Progress Report:     []  Yes  [x]  No        Progress report/ Recertification will be due (10 Rx or 30 days whichever is less): 24 May 2021      Visit # Insurance Allowable Auth Required   3 BMN []  Yes []  No        Functional Scale:   UEFI   - 24% limitation      Latex Allergy:  [x]NO      []YES  Preferred Language for Healthcare:   [x]English       []other:      Pain level:  3/10  5/11    SUBJECTIVE:  States he shoveled two loads of top soil. He states he had pain while doing it occasionally, when lifting back with the shovel. He states he is a little bit sore today from it. He states HEP is fine, sleeper stretch being the hardest. He was sore after last session.        OBJECTIVE: See eval     Observation:    Test measurements:      RESTRICTIONS/PRECAUTIONS: none    Exercises/Interventions:   Exercise/Equipment Resistance/Repetitions Other comments   Stretching/PROM     Upper trap stretch 30 sec x 3 Added     Wall slides - flex 10 sec x 10  Added    IR cane 10 sec x 10  Added    Sleeper wall stretch 10 sec x 10  Added    Pendulum          Isometrics     Retraction 10 sec x 10  Added         Weight shift flexibility, endurance, ROM of scapular, scapulothoracic and UE control with self care, reaching, carrying, lifting, house/yardwork, driving/computer work  [] (13247) Reviewed/Progressed HEP activities related to improving balance, coordination, kinesthetic sense, posture, motor skill, proprioception of scapular, scapulothoracic and UE control with self care, reaching, carrying, lifting, house/yardwork, driving/computer work      Access Code: [email or web address]  Last updated 5/11    Manual Treatments:  PROM / STM / Oscillations-Mobs:  G-I, II, III, IV (PA's, Inf., Post.)  [] (57330) Provided manual therapy to mobilize soft tissue/joints of cervical/CT, scapular GHJ and UE for the purpose of modulating pain, promoting relaxation,  increasing ROM, reducing/eliminating soft tissue swelling/inflammation/restriction, improving soft tissue extensibility and allowing for proper ROM for normal function with self care, reaching, carrying, lifting, house/yardwork, driving/computer work    Modalities:     [] GAME READY (VASO)- for significant edema, swelling, pain control. Charges:  Timed Code Treatment Minutes: 39'    Total Treatment Minutes: 39'      [] EVAL (LOW) 04312 (typically 20 minutes face-to-face)  [] EVAL (MOD) 10127 (typically 30 minutes face-to-face)  [] EVAL (HIGH) 40702 (typically 45 minutes face-to-face)  [] RE-EVAL     [x] XF(37277) x 1    [] IONTO  [x] NMR (27314) x 1    [] VASO  [x] Manual (20930) x 1    [] Other:  [] TA x      [] Mech Traction (82209)  [] ES(attended) (15110)      [] ES (un) (65211):         ASSESSMENT:        GOALS:  Patient stated goal: flex and strength   [] Progressing: [] Met: [] Not Met: [] Adjusted    Therapist goals for Patient:   Short Term Goals: To be achieved in: 2 weeks  1. Independent in HEP and progression per patient tolerance, in order to prevent re-injury. [] Progressing: [] Met: [] Not Met: [] Adjusted   2.  Patient will have a decrease in pain to facilitate improvement in PT noted hypomobility with L cervical mobility. He guillaume the progression of resisted shoulder IR and ER pain free. 5/11    Patient education: Patient education on PT and plan of care including diagnosis, prognosis, treatment goals and options. Patient agrees with discussed POC and treatment and is aware of rehab process. 4/26    PLAN: See eval 4/26  [] Continue per plan of care [] Alter current plan (see comments above)  [x] Plan of care initiated [] Hold pending MD visit [] Discharge      Electronically signed by:  Justin Romero, PT, DPT     Note: If patient does not return for scheduled/ recommended follow up visits, this note will serve as a discharge from care along with most recent update on progress.

## 2021-05-18 ENCOUNTER — HOSPITAL ENCOUNTER (OUTPATIENT)
Dept: PHYSICAL THERAPY | Age: 71
Setting detail: THERAPIES SERIES
Discharge: HOME | End: 2021-05-18
Payer: MEDICARE

## 2021-05-18 PROCEDURE — 97110 THERAPEUTIC EXERCISES: CPT | Performed by: PHYSICAL THERAPIST

## 2021-05-18 PROCEDURE — 97140 MANUAL THERAPY 1/> REGIONS: CPT | Performed by: PHYSICAL THERAPIST

## 2021-05-18 NOTE — FLOWSHEET NOTE
[address] [facility],[address], [address] [facility] [place], [address]  Phone: [phone]   Fax: [phone]    Physical Therapy Treatment Note/ Progress Report:           Date:  2021    Patient Name:  Shahbaz Calix    :  1950  MRN: [id number]  Restrictions/Precautions:    Medical/Treatment Diagnosis Information:  Diagnosis: Arthritis of left shoulder region - M19.012  Treatment Diagnosis: decreased ROM, strength, and high-level IADLs  Insurance/Certification information:  PT Insurance Information: [facility]  Physician Information:  Referring Practitioner: Dr. Kristyn Stevenson  Has the plan of care been signed (Y/N):        []  Yes  [x]  No     Date of Patient follow up with Physician: 21 - Dr. Jennifer Bal      Is this a Progress Report:     []  Yes  [x]  No        Progress report/ Recertification will be due (10 Rx or 30 days whichever is less): 24 May 2021      Visit # Insurance Allowable Auth Required   4 BMN []  Yes []  No        Functional Scale:   UEFI   - 24% limitation      Latex Allergy:  [x]NO      []YES  Preferred Language for Healthcare:   [x]English       []other:      Pain level:  0/10      SUBJECTIVE:  States he felt good after last session, but felt some soreness. States that he felt better after last session, but he also hasn't been doing anything \"crazy\". States that he has been completing his exercises at home except for 2 days due to a camping trip. Pt states that he does not have pain unless he's moving.      OBJECTIVE: See eval     Observation:    Test measurements:      RESTRICTIONS/PRECAUTIONS: none    Exercises/Interventions:   Exercise/Equipment Resistance/Repetitions Other comments   Stretching/PROM     Upper trap stretch 30 sec x 3 Added     Wall slides- abd 10 sec x 10 Added    Wall slides - flex 10 sec x 10  Added    IR cane 10 sec x 10  Added    Sleeper wall stretch 10 sec x 10  Added    Pendulum Isometrics     Retraction  Added 4/26        Weight shift     Flexion Added 5/4   Abduction Added 5/4   External Rotation Added 5/4   Internal Rotation Added 5/4   Biceps     Triceps          PRE's     Scaption 10 x 3 2# Added 5/11   Abduction     External Rotation     Internal Rotation     Shrugs     EXT     Reverse Flys     Serratus     Horizontal Abd with ER     Biceps     Triceps     Retraction     T's 10 x 2 Leaning on table Added 5/18   Y's 10 x 2 Leaning on table Added 5/18        Cable Column/Theraband     External Rotation 10 x 3 blue TB  Added 5/11   Internal Rotation 10 x 3 black TB  Added 5/11   Shrugs     Lats     Ext 10 x 3 blue TB  Added 5/11   Flex     Scapular Retraction 10 x 3 blue TB Added 5/11   BIC     TRIC     PNF          Dynamic Stability          Plyoback          Manual interventions     Rib 1 & 2 mobilizations 2' grade III-IV  5/18   Shoulder distraction mobilizations 4'  Grade III-IV Added 5/18   Thoracic mobilizations Cervical downglides and occipital release 5' grade III-IV 5/18           Therapeutic Exercise and NMR EXR  [x] (26102) Provided verbal/tactile cueing for activities related to strengthening, flexibility, endurance, ROM  for improvements in scapular, scapulothoracic and UE control with self care, reaching, carrying, lifting, house/yardwork, driving/computer work. [x] (92586) Provided verbal/tactile cueing for activities related to improving balance, coordination, kinesthetic sense, posture, motor skill, proprioception  to assist with  scapular, scapulothoracic and UE control with self care, reaching, carrying, lifting, house/yardwork, driving/computer work.     Therapeutic Activities:    [x] (81418 or 53742) Provided verbal/tactile cueing for activities related to improving balance, coordination, kinesthetic sense, posture, motor skill, proprioception and motor activation to allow for proper function of scapular, scapulothoracic and UE control with self care, Patient limited by other medical complications  [] Other: Pt guillaume session well. PT noted hypomobility with cervical downglides today. Pt reported slight pain during Y's exercise, but did not keep him from completing sets. Pt was instructed to keep up with HEP and will update based on response of this session. 5/18    Patient education: Patient education on PT and plan of care including diagnosis, prognosis, treatment goals and options. Patient agrees with discussed POC and treatment and is aware of rehab process. 4/26    PLAN: See eval 4/26  [] Continue per plan of care [] Alter current plan (see comments above)  [x] Plan of care initiated [] Hold pending MD visit [] Discharge      Electronically signed by: Sandra Sheehan, student physical therapist  Therapist was present, directed the patient's care, made skilled judgement, and was responsible for assessment and treatment of the patient. Chen Or, PT, DPT     Note: If patient does not return for scheduled/ recommended follow up visits, this note will serve as a discharge from care along with most recent update on progress.

## 2021-05-25 ENCOUNTER — APPOINTMENT (OUTPATIENT)
Dept: PHYSICAL THERAPY | Age: 71
End: 2021-05-25
Payer: MEDICARE

## 2021-05-25 ENCOUNTER — HOSPITAL ENCOUNTER (OUTPATIENT)
Dept: PHYSICAL THERAPY | Age: 71
Setting detail: THERAPIES SERIES
Discharge: HOME | End: 2021-05-25
Payer: MEDICARE

## 2021-05-25 PROCEDURE — 97140 MANUAL THERAPY 1/> REGIONS: CPT | Performed by: PHYSICAL THERAPIST

## 2021-05-25 PROCEDURE — 97530 THERAPEUTIC ACTIVITIES: CPT | Performed by: PHYSICAL THERAPIST

## 2021-05-25 PROCEDURE — 97110 THERAPEUTIC EXERCISES: CPT | Performed by: PHYSICAL THERAPIST

## 2021-05-25 NOTE — PLAN OF CARE
[address] [facility],[address], [address] [place], [address]  Phone: [phone]   Fax: [phone]       Physical Therapy Re-Certification Plan of Care    Dear Dr. Maira Parker,    We had the pleasure of treating the following patient for physical therapy services at [address] [place] [address]. A summary of our findings can be found in the updated assessment below. This includes our plan of care. If you have any questions or concerns regarding these findings, please do not hesitate to contact me at the office phone number checked above. Thank you for the referral.     Physician Signature:________________________________Date:__________________  By signing above (or electronic signature), therapists plan is approved by physician      Overall Response to Treatment:   []Patient is responding well to treatment and improvement is noted with regards  to goals   []Patient should continue to improve in reasonable time if they continue HEP   []Patient has plateaued and is no longer responding to skilled PT intervention    []Patient is getting worse and would benefit from return to referring MD   []Patient unable to adhere to initial POC   [x]Other:Pt presents to PT with a min improvement in L shoulder ROM, except shoulder ABD ROM. He continues to have pain with overhead motion. He demonstrates improved shoulder ER and IR strength, but continues to lack shoulder flex and ABD strength. He continues to progress well with current routine, guillaume increase in resistance. Recommend has a follow-up with MD next week and will follow-up with PT after pending MD visit.     Date range of previous POC Visits:  -     Total Visits: 5        Physical Therapy Treatment Note/ Progress Report:           Date:  2021    Patient Name:  Chante Olvera    :  1950  MRN: [id number]  Restrictions/Precautions:    Medical/Treatment Diagnosis Information:  Diagnosis: Arthritis of left shoulder region - M19.012  Treatment Diagnosis: decreased ROM, strength, and high-level IADLs  Insurance/Certification information:  PT Insurance Information: [facility]  Physician Information:  Referring Practitioner: Dr. Kenyon Ortiz  Has the plan of care been signed (Y/N):        []  Yes  [x]  No     Date of Patient follow up with Physician: 5/6/21 - Dr. Ricardo Canada      Is this a Progress Report:     [x]  Yes  [x]  No        Progress report/ Recertification will be due (10 Rx or 30 days whichever is less): 22 June 2021      Visit # Insurance Allowable Auth Required   5 BMN []  Yes []  No        Functional Scale:   UEFI  4/26 - 24% limitation  5/25 - 10% limitation       Latex Allergy:  [x]NO      []YES  Preferred Language for Healthcare:   [x]English       []other:      Pain level:  6 /10  5/25    SUBJECTIVE:  States he was pretty sore after last session. He states he felt like he went backwards after last session. He couldn't sleep. He states he didn't do any of his HEP last week. He does not want to do the shoulder mobilizations today. 5/25    OBJECTIVE:  5/25   Observation:    Test measurements:               ROM AROM  Comment     L R     Flexion 159 + pain  165     Abduction 134 + pain   140 + pain      ER 81 70     IR 45 39     Other(cervical)               Strength L R Comment   Flexion 4-/5 4/5     Abduction 4/5 4-/5     ER 4/5 4-/5     IR 4+/5 4-/5               Lower Trap         Mid Trap  4-/5       Rhomboids 4+/5 4/5     Biceps 4+/5 4+/5     Triceps 4+/5 4+/5        Special Tests Results/Comment   Casey-Glenn Pos   Neers Pos   Speeds Neg   OBriens Neg   Other        Reflexes/Sensation:               [x]? Dermatomes/Myotomes intact               [x]? Reflexes equal and normal bilaterally              []?Other:     Joint mobility:               []?Normal                       [x]? Hypo - cervical               []?Hyper     Palpation: no TTP      Posture: rounded Provided verbal/tactile cueing for activities related to improving balance, coordination, kinesthetic sense, posture, motor skill, proprioception and motor activation to allow for proper function of scapular, scapulothoracic and UE control with self care, carrying, lifting, driving/computer work. Home Exercise Program:    [x] (93377) Reviewed/Progressed HEP activities related to strengthening, flexibility, endurance, ROM of scapular, scapulothoracic and UE control with self care, reaching, carrying, lifting, house/yardwork, driving/computer work  [] (74464) Reviewed/Progressed HEP activities related to improving balance, coordination, kinesthetic sense, posture, motor skill, proprioception of scapular, scapulothoracic and UE control with self care, reaching, carrying, lifting, house/yardwork, driving/computer work      Access Code: [email or web address]  Last updated 5/11    Manual Treatments:  PROM / STM / Oscillations-Mobs:  G-I, II, III, IV (PA's, Inf., Post.)  [] (44714) Provided manual therapy to mobilize soft tissue/joints of cervical/CT, scapular GHJ and UE for the purpose of modulating pain, promoting relaxation,  increasing ROM, reducing/eliminating soft tissue swelling/inflammation/restriction, improving soft tissue extensibility and allowing for proper ROM for normal function with self care, reaching, carrying, lifting, house/yardwork, driving/computer work    Modalities:     [] GAME READY (VASO)- for significant edema, swelling, pain control.      Charges:  Timed Code Treatment Minutes: 48'    Total Treatment Minutes: 62'       [] EVAL (LOW) 75910 (typically 20 minutes face-to-face)  [] EVAL (MOD) 43075 (typically 30 minutes face-to-face)  [] EVAL (HIGH) 39091 (typically 45 minutes face-to-face)  [] RE-EVAL     [x] NA(01831) x 2  [] IONTO  [] NMR (52811) x    [] VASO  [x] Manual (84783) x  1   [] Other:  [x] TA x  1    [] Mech Traction (04986)  [] ES(attended) (01316)      [] ES (un) (20194):         ASSESSMENT: GOALS: 5/25  Patient stated goal: flex and strength   [x] Progressing: [] Met: [] Not Met: [] Adjusted    Therapist goals for Patient:   Short Term Goals: To be achieved in: 2 weeks  1. Independent in HEP and progression per patient tolerance, in order to prevent re-injury. [] Progressing: [x] Met: [] Not Met: [] Adjusted   2. Patient will have a decrease in pain to facilitate improvement in movement, function, and ADLs as indicated by Functional Deficits. [] Progressing: [x] Met: [] Not Met: [] Adjusted    Long Term Goals: To be achieved in: 4-6 weeks  1. Disability index score of 12% or less for the UEFI to assist with reaching prior level of function. [] Progressing: [x] Met: [] Not Met: [] Adjusted  2. Patient will demonstrate increased left shoulder flex AROM to greater than or equal to 165 deg and IR AROM to greater than or equal to 50 deg to allow for proper joint functioning as indicated by patients Functional Deficits. [x] Progressing: [] Met: [] Not Met: [] Adjusted  3. Patient will demonstrate an increase in left shoulder (flex, ABD, IR, and ER) strength to 4+/5 to allow for proper functional mobility as indicated by patients Functional Deficits. [x] Progressing: [] Met: [] Not Met: [] Adjusted  4. Patient will return to ADLs without increased symptoms or restriction. [x] Progressing: [] Met: [] Not Met: [] Adjusted  5. Patient will report working pain free. [x] Progressing: [] Met: [] Not Met: [] Adjusted      Overall Progression Towards Functional goals/ Treatment Progress Update:  [] Patient is progressing as expected towards functional goals listed. [] Progression is slowed due to complexities/Impairments listed. [] Progression has been slowed due to co-morbidities.   [x] Plan just implemented, too soon to assess goals progression <30days   [] Goals require adjustment due to lack of progress  [] Patient is not progressing as expected and requires additional follow up with physician  [] Other    Prognosis for POC: [x] Good [] Fair  [] Poor      Patient requires continued skilled intervention: [x] Yes  [] No      Treatment/Activity Tolerance:  [x] Patient tolerated treatment well [] Patient limited by fatique  [] Patient limited by pain  [] Patient limited by other medical complications  [] Other: Pt presents to PT with a min improvement in L shoulder ROM, except shoulder ABD ROM. He continues to have pain with overhead motion. He demonstrates improved shoulder ER and IR strength, but continues to lack shoulder flex and ABD strength. He continues to progress well with current routine, guillaume increase in resistance. Recommend has a follow-up with MD next week and will follow-up with PT after pending MD visit. 5/25    Patient education: Patient education on PT and plan of care including diagnosis, prognosis, treatment goals and options. Patient agrees with discussed POC and treatment and is aware of rehab process. 4/26    PLAN: 1x/ week for 4 weeks pending MD visit (5/25 - 6/22)   [] Continue per plan of care [x] Alter current plan (see comments above)  [] Plan of care initiated [] Hold pending MD visit [] Discharge      Electronically signed by:    Grisel Sosa, PT, DPT     Note: If patient does not return for scheduled/ recommended follow up visits, this note will serve as a discharge from care along with most recent update on progress.

## 2021-06-03 ENCOUNTER — OFFICE VISIT (OUTPATIENT)
Dept: ORTHOPEDIC SURGERY | Age: 71
End: 2021-06-03
Payer: MEDICARE

## 2021-06-03 VITALS — WEIGHT: 210 LBS | HEIGHT: 72 IN | BODY MASS INDEX: 28.44 KG/M2

## 2021-06-03 DIAGNOSIS — M19.012 PRIMARY OSTEOARTHRITIS OF LEFT SHOULDER: ICD-10-CM

## 2021-06-03 DIAGNOSIS — M19.012 ARTHRITIS OF LEFT SHOULDER REGION: Primary | ICD-10-CM

## 2021-06-03 DIAGNOSIS — M25.512 LEFT SHOULDER PAIN, UNSPECIFIED CHRONICITY: ICD-10-CM

## 2021-06-03 DIAGNOSIS — M25.512 ACUTE PAIN OF LEFT SHOULDER: ICD-10-CM

## 2021-06-03 PROCEDURE — 99213 OFFICE O/P EST LOW 20 MIN: CPT | Performed by: ORTHOPAEDIC SURGERY

## 2021-06-03 NOTE — PROGRESS NOTES
[address] [facility]  Office Visit  Follow up  Date:  6/3/2021    Name:  Davis No  Address:  [address] [facility] [address]    :  1950      Age:   72 y.o.    SSN:  [id number]      Medical Record Number:  <[id number]>    Chief Complaint:    Follow up for L shoulder    HPI:   Davis No is a 72 y.o. male here today for follow-up for his left shoulder arthritis and AC joint arthritis. The patient has been doing therapy. He is doing well. He reports that he is happy with his progress. 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 He is going out of town in the next couple weeks to [facility]. Prior HPI 21:  He has known moderate glenohumeral osteoarthritis of the left shoulder. He was able to get into some physical therapy but states he's only had two visits as they are behind in scheduling. Additionally he underwent an ultrasound guided injection by Dr. Ashley Muñoz. Overall he has not noticed a significant improvement in symptoms. He continues to modify activities to avoid pain. Most of his pain is in the early mornings. He reports no new injuries or setbacks. He denies any new numbness, tingling, fevers, chills, chest pain, shortness of breath, or any other new significant symptoms. Pain Assessment  Location of Pain: Shoulder  Location Modifiers: Left  Severity of Pain: 0  Limiting Behavior: No  Relieving Factors: Rest  Work-Related Injury: No  Are there other pain locations you wish to document?: No    Past History:  No past medical history on file. Past Surgical History:   Procedure Laterality Date    HERNIA REPAIR N/A     KNEE ARTHROSCOPY Bilateral     TONSILLECTOMY N/A        Social History     Tobacco Use    Smoking status: Never Smoker    Smokeless tobacco: Never Used   Vaping Use    Vaping Use: Never used   Substance Use Topics    Alcohol use:  Yes     Alcohol/week: 12.0 standard drinks Types: 12 Cans of beer per week    Drug use: Never        Family History:  family history is not on file. Current Outpatient Medications:     OMEPRAZOLE PO, Take by mouth as needed, Disp: , Rfl:       Allergies   Allergen Reactions    Chlorzoxazone Other (See Comments)     fever           Review of Systems: A 14 point review of systems available in the scanned medical record as documented by the patient on 6/3/21. Today's review pertinent items are noted in HPI. Patient has had no medical changes since last evaluated        Physical Exam:  Ht 6' (1.829 m)   Wt 210 lb (95.3 kg)   BMI 28.48 kg/m²     General: No acute distress, well nourished  CV: No obvious peripheral edema. Normal peripheral pulses  Neuro: Alert & oriented x 3  Psych: Normal mood and affect    Left shoulder exam    Inspection:  Held in a normal posture. Normal contour at the acromioclavicular joint. No swelling, ecchymosis, or erythema about the shoulder. No atrophy appreciated. No scapular winging. Palpation:  No subacromial crepitus. No tenderness of the AC joint. No greater tuberosity tenderness. No tenderness in the bicipital groove. Range of Motion: Active ROM: Forward flexion 145 degrees, external rotation 45 degrees, internal rotation L2. Normal scapulothoracic rhythm. Strength:  Normal supraspinatus, infraspinatus, and subscapularis muscle strength. Stability: No anterior instability. No posterior instability. Special Tests: Impingement findings are negative. Labral findings are negative. Speed sign and Yergason signs are both negative. Crossover sign is negative. Belly press sign is negative. Lift off sign is negative. Other findings: The skin is warm dry and well perfused. 2+ radial pulse. Sensation is intact to light touch over the deltoid. Radiographic:  No new xrays today. Assessment:  Chante Olvera is a 72 y.o. male with:  1.  Left shoulder mild glenohumeral osteoarthritis and asymptomatic AC joint arthritis    Impression:  Encounter Diagnoses   Name Primary?  Arthritis of left shoulder region Yes    Acute pain of left shoulder     Left shoulder pain, unspecified chronicity     Primary osteoarthritis of left shoulder        Office Procedures:  No orders of the defined types were placed in this encounter. Plan: At this time we recommend that Kindra Gore complete his last 2 physical therapy appointments and that he can transition to a home program after that point. He should continue with these exercises moving forward to maintain strength and motion. We are happy to see him at any point to give him new therapy orders if he suffers a setback or for injections, or if his pain increases. A total shoulder is the final step but if he continues to do well with nonoperative program we will keep him on that pathway. Follow-up with us annually for repeat x-rays to monitor the progression of his artrosis. Total time spent for evaluation, education, and development of treatment plan: 25 minutes. Bhakti Adams DO  [facility] Clinical Fellow    The encounter with Kindra Gore was supervised by Dr. Yolanda Escobar who personally examined the patient and reviewed the plan. This dictation was performed with a verbal recognition program (DRAGON) and it was checked for errors. It is possible that there are still dictated errors within this office note. If so, please bring any errors to my attention for an addendum. All efforts were made to ensure that this office note is accurate.   _____  I was physically present and personally supervised the Orthopaedic Sports Medicine Fellow in the evaluation and development of a treatment plan for this patient. I personally interviewed the patient and performed a physical examination. In addition, I discussed the patient's condition and treatment options with them.  I have also reviewed and agree with the past medical, family and social history unless otherwise noted. All of the patient's questions were answered. Cong Waldron MD, PhD  6/3/2021

## 2021-06-15 ENCOUNTER — HOSPITAL ENCOUNTER (OUTPATIENT)
Dept: PHYSICAL THERAPY | Age: 71
Setting detail: THERAPIES SERIES
Discharge: HOME | End: 2021-06-15
Payer: MEDICARE

## 2021-06-15 PROCEDURE — 97140 MANUAL THERAPY 1/> REGIONS: CPT | Performed by: PHYSICAL THERAPIST

## 2021-06-15 PROCEDURE — 97530 THERAPEUTIC ACTIVITIES: CPT | Performed by: PHYSICAL THERAPIST

## 2021-06-15 PROCEDURE — 97110 THERAPEUTIC EXERCISES: CPT | Performed by: PHYSICAL THERAPIST

## 2021-06-15 NOTE — DISCHARGE SUMMARY
[address], [address] [place], [address]  Phone: [phone]   Fax: [phone]       Physical Therapy Discharge Summary    Dear Dr. Kristan Romero,    We had the pleasure of treating the following patient for physical therapy services at [address] [place] [address]. A summary of our findings can be found in the discharge summary below. If you have any questions or concerns regarding these findings, please do not hesitate to contact me at the office phone number checked above. Thank you for the referral.     Physician Signature:________________________________Date:__________________  By signing above (or electronic signature), therapists plan is approved by physician      Overall Response to Treatment:   []Patient is responding well to treatment and improvement is noted with regards  to goals   []Patient should continue to improve in reasonable time if they continue HEP   []Patient has plateaued and is no longer responding to skilled PT intervention    []Patient is getting worse and would benefit from return to referring MD   []Patient unable to adhere to initial POC   [x]Other: Pt returns from vacation with minimal pain. He demonstrates improved shoulder ABD and ER ROM. He demonstrates increased shoulder flex strength, but continues to lack mid trap strength. He denied pain throughout the session. He is independent with his HEP and educated to continue his HEP 3-4x/ week to prevent pain from returning. Therefore, pt is being D/C to HEP at this time. He was educated to call with any concerns/ questions.     Date range of Visits:21 - 6/15/21    Total Visits: 6    Physical Therapy Treatment Note/ Progress Report:           Date:  6/15/2021    Patient Name:  Luz Medina    :  1950  MRN: [id number]  Restrictions/Precautions:    Medical/Treatment Diagnosis Information:  Diagnosis: Arthritis of left shoulder region - M19.012  Treatment Diagnosis: decreased ROM, strength, and high-level IADLs  Insurance/Certification information:  PT Insurance Information: [address]  Physician Information:  Referring Practitioner: Dr. Natalie Cunningham  Has the plan of care been signed (Y/N):        []  Yes  [x]  No     Date of Patient follow up with Physician: 5/6/21 - Dr. Janelle Benito      Is this a Progress Report:     [x]  Yes  []  No          Visit # Insurance Allowable Auth Required   6 BMN []  Yes []  No        Functional Scale:   UEFI  4/26 - 24% limitation  5/25 - 10% limitation  6/15 - 4% limitation        Latex Allergy:  [x]NO      []YES  Preferred Language for Healthcare:   [x]English       []other:      Pain level:  0 /10  6/15    SUBJECTIVE:  States he saw the MD before he left, who said he could wrap up PT and transition to HEP. He states he did not have any shoulder pain while he was on vacation last week. He states he is still watching what he does. He states he started to have some neck pain after the 12 hour drive home from vacation. 6/15    OBJECTIVE:  6/15   Observation:    Test measurements:             ROM AROM  Comment     L R     Flexion 160 + pain at end range 165     Abduction 145 + pain at end range   140 + pain      ER 89 70     IR 49 39     Other(cervical)               Strength L R Comment   Flexion 4/5 4/5     Abduction 4/5 4-/5     ER 4/5 4-/5     IR 4+/5 4-/5               Lower Trap         Mid Trap  4-/5       Rhomboids 4+/5 4/5     Biceps 4+/5 4+/5     Triceps 4+/5 4+/5        Special Tests Results/Comment   Casey-Glenn Pos   Neers Pos   Speeds Neg   OBriens Neg   Other         Joint mobility:               []?Normal                       [x]? Hypo - cervical               []?Hyper     Palpation: no TTP      Posture: rounded shoulders      RESTRICTIONS/PRECAUTIONS: none    Exercises/Interventions:   Exercise/Equipment Resistance/Repetitions Other comments   Stretching/PROM     Upper trap stretch 30 sec x 3 Added 4/26    Wall slides- abd 10 sec x 10 Added 5/18   Wall slides - flex 10 sec x 10  Added 4/26   IR cane 10 sec x 10  Added 4/26   Sleeper wall stretch 10 sec x 10  Added 4/26   Pendulum               PRE's     Scaption Abduction     External Rotation     Internal Rotation     Shrugs     EXT     Reverse Flys     Serratus     Horizontal Abd with ER     Biceps     Triceps     Retraction     T's 10 x 3 1# ^5/25   Y's 10 x 3 1# ^5/25        Cable Column/Theraband     External Rotation 10 x 3 blue TB  Added 5/11   Internal Rotation 10 x 3 black TB  Added 5/11   Shrugs     Lats     Ext 10 x 3 black TB  ^5/25   Flex     Scapular Retraction 10 x 3 black TB ^5/25   BIC     TRIC     PNF          Dynamic Stability     Ball on the wall 4-way, kickball, 10 x 3 each Added 5/25        Plyoback          Manual interventions     Rib 1 & 2 mobilizations 3' grade III-IV  5/18   Shoulder posterior and inferior mobilizations Thoracic mobilizations Cervical downglides and occipital release 5' grade III-V 5/25           Therapeutic Exercise and NMR EXR  [x] (33239) Provided verbal/tactile cueing for activities related to strengthening, flexibility, endurance, ROM  for improvements in scapular, scapulothoracic and UE control with self care, reaching, carrying, lifting, house/yardwork, driving/computer work. [x] (81573) Provided verbal/tactile cueing for activities related to improving balance, coordination, kinesthetic sense, posture, motor skill, proprioception  to assist with  scapular, scapulothoracic and UE control with self care, reaching, carrying, lifting, house/yardwork, driving/computer work.     Therapeutic Activities:    [x] (55891 or 93985) Provided verbal/tactile cueing for activities related to improving balance, coordination, kinesthetic sense, posture, motor skill, proprioception and motor activation to allow for proper function of scapular, scapulothoracic and UE control with self care, carrying, lifting, driving/computer work.     Home Exercise Program:    [x] (45220) Reviewed/Progressed HEP activities related to strengthening, flexibility, endurance, ROM of scapular, scapulothoracic and UE control with self care, reaching, carrying, lifting, house/yardwork, driving/computer work  [] (74407) Reviewed/Progressed HEP activities related to improving balance, coordination, kinesthetic sense, posture, motor skill, proprioception of scapular, scapulothoracic and UE control with self care, reaching, carrying, lifting, house/yardwork, driving/computer work      Access Code: [email or web address]  Last updated 5/11    Manual Treatments:  PROM / STM / Oscillations-Mobs:  G-I, II, III, IV (PA's, Inf., Post.)  [] ([id number]) Provided manual therapy to mobilize soft tissue/joints of cervical/CT, scapular GHJ and UE for the purpose of modulating pain, promoting relaxation,  increasing ROM, reducing/eliminating soft tissue swelling/inflammation/restriction, improving soft tissue extensibility and allowing for proper ROM for normal function with self care, reaching, carrying, lifting, house/yardwork, driving/computer work    Modalities:     [] GAME READY (VASO)- for significant edema, swelling, pain control. Charges:  Timed Code Treatment Minutes: 48'   Total Treatment Minutes: 62'       [] EVAL (LOW) 68552 (typically 20 minutes face-to-face)  [] EVAL (MOD) 96640 (typically 30 minutes face-to-face)  [] EVAL (HIGH) 89029 (typically 45 minutes face-to-face)  [] RE-EVAL     [x] LEE(60117) x 1  [] IONTO  [] NMR (61593) x    [] VASO  [x] Manual (80967) x  1   [] Other:  [x] TA x  1    [] Mech Traction (76407)  [] ES(attended) (86469)      [] ES (un) (11533):         ASSESSMENT:        GOALS: 6/15  Patient stated goal: flex and strength   [x] Progressing: [] Met: [] Not Met: [] Adjusted    Therapist goals for Patient:   Short Term Goals: To be achieved in: 2 weeks  1. Independent in HEP and progression per patient tolerance, in order to prevent re-injury.      [] Progressing: [x] Met: [] Not Met: [] Adjusted   2. Patient will have a decrease in pain to facilitate improvement in movement, function, and ADLs as indicated by Functional Deficits. [] Progressing: [x] Met: [] Not Met: [] Adjusted    Long Term Goals: To be achieved in: 4-6 weeks  1. Disability index score of 12% or less for the UEFI to assist with reaching prior level of function. [] Progressing: [x] Met: [] Not Met: [] Adjusted  2. Patient will demonstrate increased left shoulder flex AROM to greater than or equal to 165 deg and IR AROM to greater than or equal to 50 deg to allow for proper joint functioning as indicated by patients Functional Deficits. [x] Progressing: [] Met: [] Not Met: [] Adjusted  3. Patient will demonstrate an increase in left shoulder (flex, ABD, IR, and ER) strength to 4+/5 to allow for proper functional mobility as indicated by patients Functional Deficits. [x] Progressing: [] Met: [] Not Met: [] Adjusted  4. Patient will return to ADLs without increased symptoms or restriction. [x] Progressing: [] Met: [] Not Met: [] Adjusted  5. Patient will report working pain free. [x] Progressing: [] Met: [] Not Met: [] Adjusted      Overall Progression Towards Functional goals/ Treatment Progress Update:  [] Patient is progressing as expected towards functional goals listed. [] Progression is slowed due to complexities/Impairments listed. [] Progression has been slowed due to co-morbidities.   [x] Plan just implemented, too soon to assess goals progression <30days   [] Goals require adjustment due to lack of progress  [] Patient is not progressing as expected and requires additional follow up with physician  [] Other    Prognosis for POC: [x] Good [] Fair  [] Poor      Patient requires continued skilled intervention: [x] Yes  [] No      Treatment/Activity Tolerance:  [x] Patient tolerated treatment well [] Patient limited by fatique  [] Patient limited by pain  [] Patient limited by other medical complications  [] Other: Pt returns from vacation with minimal pain. He demonstrates improved shoulder ABD and ER ROM. He demonstrates increased shoulder flex strength, but continues to lack mid trap strength. He denied pain throughout the session. He is independent with his HEP and educated to continue his HEP 3-4x/ week to prevent pain from returning. Therefore, pt is being D/C to HEP at this time. He was educated to call with any concerns/ questions. 6/15    Patient education: Patient education on PT and plan of care including diagnosis, prognosis, treatment goals and options. Patient agrees with discussed POC and treatment and is aware of rehab process. 4/26    PLAN: 9/15  [] Continue per plan of care [] Alter current plan (see comments above)  [] Plan of care initiated [] Hold pending MD visit [x] Discharge      Electronically signed by:    Grisel Soas PT, DPT     Note: If patient does not return for scheduled/ recommended follow up visits, this note will serve as a discharge from care along with most recent update on progress.

## 2021-06-29 ENCOUNTER — APPOINTMENT (OUTPATIENT)
Dept: PHYSICAL THERAPY | Age: 71
End: 2021-06-29
Payer: MEDICARE

## 2023-03-30 ENCOUNTER — OFFICE VISIT (OUTPATIENT)
Dept: ORTHOPEDIC SURGERY | Age: 73
End: 2023-03-30

## 2023-03-30 VITALS — WEIGHT: 202 LBS | HEIGHT: 72 IN | BODY MASS INDEX: 27.36 KG/M2

## 2023-03-30 DIAGNOSIS — G89.29 CHRONIC PAIN OF RIGHT KNEE: Primary | ICD-10-CM

## 2023-03-30 DIAGNOSIS — M25.561 CHRONIC PAIN OF RIGHT KNEE: Primary | ICD-10-CM

## 2023-03-30 DIAGNOSIS — M17.11 ARTHRITIS OF RIGHT KNEE: ICD-10-CM

## 2023-03-30 RX ORDER — LIDOCAINE HYDROCHLORIDE 10 MG/ML
4 INJECTION, SOLUTION INFILTRATION; PERINEURAL ONCE
Status: COMPLETED | OUTPATIENT
Start: 2023-03-30 | End: 2023-03-30

## 2023-03-30 RX ORDER — ATORVASTATIN CALCIUM 20 MG/1
20 TABLET, FILM COATED ORAL DAILY
COMMUNITY
Start: 2022-08-29

## 2023-03-30 RX ORDER — METHYLPREDNISOLONE ACETATE 40 MG/ML
80 INJECTION, SUSPENSION INTRA-ARTICULAR; INTRALESIONAL; INTRAMUSCULAR; SOFT TISSUE ONCE
Status: COMPLETED | OUTPATIENT
Start: 2023-03-30 | End: 2023-03-30

## 2023-03-30 RX ORDER — LANSOPRAZOLE 30 MG/1
CAPSULE, DELAYED RELEASE ORAL
COMMUNITY
Start: 2023-03-29

## 2023-03-30 RX ADMIN — METHYLPREDNISOLONE ACETATE 80 MG: 40 INJECTION, SUSPENSION INTRA-ARTICULAR; INTRALESIONAL; INTRAMUSCULAR; SOFT TISSUE at 10:11

## 2023-03-30 RX ADMIN — LIDOCAINE HYDROCHLORIDE 4 ML: 10 INJECTION, SOLUTION INFILTRATION; PERINEURAL at 10:10

## 2023-03-30 NOTE — PROGRESS NOTES
Kathryn Ariza is seen today for evaluation of chronic right knee pain. He had right knee arthroscopy in 2015 which she says was helpful for several years. He then had Euflexxa injections in 2018, Lyle Co in 2019 and another cortisone in 2020. He does not recall any of those treatments being particularly helpful. He has medial compartment right knee pain. He takes over-the-counter Aleve. If he walks slowly he does not have much pain but he has to keep up with his wife who walks quickly he has discomfort and has to rest.  He works part-time at [employer]. I saw him last year for shoulder pain. His left knee has had arthroscopy in 2015 but he does not recall ongoing left knee symptoms. Past history otherwise significant for reflux and high cholesterol. History: Patient's relevant past family, medical, and social history are reviewed as part of today's visit. ROS of pertinent positives and negatives as above; otherwise negative. General Exam:    Vitals: Height 6' (1.829 m), weight 202 lb (91.6 kg). Constitutional: Patient is adequately groomed with no evidence of malnutrition  Mental Status: The patient is oriented to time, place and person. The patient's mood and affect are appropriate. Gait:  Patient walks with normal gait and station. Lymphatic: The lymphatic examination bilaterally reveals all areas to be without enlargement or induration. Vascular: Examination reveals no swelling or calf tenderness. Peripheral pulses are palpable and 2+. Neurological: The patient has good coordination. There is no weakness or sensory deficit. Skin:    Head/Neck: inspection reveals no rashes, ulcerations or lesions. Trunk:  inspection reveals no rashes, ulcerations or lesions. Right Lower Extremity: inspection reveals no rashes, ulcerations or lesions. Left Lower Extremity: inspection reveals no rashes, ulcerations or lesions.       Examination of the bilateral hips reveals normal

## 2023-05-01 ENCOUNTER — OFFICE VISIT (OUTPATIENT)
Dept: ORTHOPEDIC SURGERY | Age: 73
End: 2023-05-01
Payer: MEDICARE

## 2023-05-01 VITALS — WEIGHT: 202 LBS | BODY MASS INDEX: 27.36 KG/M2 | HEIGHT: 72 IN

## 2023-05-01 DIAGNOSIS — G89.29 CHRONIC PAIN OF RIGHT KNEE: ICD-10-CM

## 2023-05-01 DIAGNOSIS — M25.561 CHRONIC PAIN OF RIGHT KNEE: ICD-10-CM

## 2023-05-01 DIAGNOSIS — M17.11 ARTHRITIS OF RIGHT KNEE: Primary | ICD-10-CM

## 2023-05-01 PROCEDURE — 1123F ACP DISCUSS/DSCN MKR DOCD: CPT | Performed by: ORTHOPAEDIC SURGERY

## 2023-05-01 PROCEDURE — 99212 OFFICE O/P EST SF 10 MIN: CPT | Performed by: ORTHOPAEDIC SURGERY

## 2023-05-01 NOTE — PROGRESS NOTES
Joelle Johnson returns today to follow-up his right knee. He does not get consistent pain. He wants to become more active and thinks his knee is going to hurt when he becomes active. He says that most walking activities are pain-free. He bikes regularly. He gave the example of bending over and kneeling on his knee and it caused pain for about 30 seconds. He wants his knee to be normal.  He is inquiring as to whether getting lubricant shots makes sense at this point. He says his pain is currently 1 out of 10    General Exam:    Vitals: Height 6' (1.829 m), weight 202 lb (91.6 kg). Constitutional: Patient is adequately groomed with no evidence of malnutrition  Mental Status: The patient is oriented to time, place and person. The patient's mood and affect are appropriate. Right knee has mild crepitation but no joint line pain or effusion. Left knee is similar with mild crepitation but no joint line pain or effusion. Assessment: Minimally symptomatic bilateral knee arthritis. Plan: At this point I am not recommending further intervention. If he has pain that starts to limit him with activities of daily living or exercise activities I would consider viscosupplementation but at this point in my opinion it is not indicated. He understands this. Follow-up with me on an as-needed basis.

## 2023-12-04 ENCOUNTER — OFFICE VISIT (OUTPATIENT)
Dept: ORTHOPEDIC SURGERY | Age: 73
End: 2023-12-04
Payer: MEDICARE

## 2023-12-04 VITALS — HEIGHT: 73 IN | WEIGHT: 210 LBS | BODY MASS INDEX: 27.83 KG/M2

## 2023-12-04 DIAGNOSIS — M17.11 ARTHRITIS OF RIGHT KNEE: Primary | ICD-10-CM

## 2023-12-04 DIAGNOSIS — M25.561 CHRONIC PAIN OF RIGHT KNEE: ICD-10-CM

## 2023-12-04 DIAGNOSIS — G89.29 CHRONIC PAIN OF RIGHT KNEE: ICD-10-CM

## 2023-12-04 PROCEDURE — 1123F ACP DISCUSS/DSCN MKR DOCD: CPT | Performed by: ORTHOPAEDIC SURGERY

## 2023-12-04 PROCEDURE — 99212 OFFICE O/P EST SF 10 MIN: CPT | Performed by: ORTHOPAEDIC SURGERY

## 2023-12-04 NOTE — PROGRESS NOTES
Miguel Angel Rojas returns today for his right knee. He got a cortisone injection back in March. He now reports pain that is somewhat from 4-5 out of 10 but some days can be 10 out of 10. We have discussed viscosupplementation in the past.    General Exam:    Vitals: Height 1.854 m (6' 1\"), weight 95.3 kg (210 lb). Constitutional: Patient is adequately groomed with no evidence of malnutrition  Mental Status: The patient is oriented to time, place and person. The patient's mood and affect are appropriate. Left knee exam is very benign. He has no tenderness. He has full motion. He has mild crepitation but no instability or swelling. Right knee has pain over the medial joint line. He has moderate crepitation. Range of motion 0 to 125 degrees. He is stable with no effusion. X-rays were again reviewed. These demonstrate: Significant tricompartmental disease right knee greater than left. There is more medial narrowing on the right and more lateral on the left. Assessment: Arthritic change bilateral knees right currently more symptomatic. Plan: We discussed options at length including repeat injection, viscosupplementation or consideration of arthroplasty. At this point he wants a referral for arthroplasty. We gave him Dr. Gonzalez Files name. Follow-up on an as-needed basis.